# Patient Record
Sex: FEMALE | Race: WHITE | Employment: OTHER | URBAN - NONMETROPOLITAN AREA
[De-identification: names, ages, dates, MRNs, and addresses within clinical notes are randomized per-mention and may not be internally consistent; named-entity substitution may affect disease eponyms.]

---

## 2021-03-25 ENCOUNTER — OFFICE VISIT (OUTPATIENT)
Dept: CARDIOLOGY CLINIC | Age: 76
End: 2021-03-25
Payer: MEDICARE

## 2021-03-25 VITALS
HEIGHT: 61 IN | OXYGEN SATURATION: 98 % | DIASTOLIC BLOOD PRESSURE: 72 MMHG | SYSTOLIC BLOOD PRESSURE: 128 MMHG | HEART RATE: 78 BPM | WEIGHT: 279 LBS | RESPIRATION RATE: 22 BRPM | BODY MASS INDEX: 52.67 KG/M2

## 2021-03-25 DIAGNOSIS — I34.0 MITRAL VALVE INSUFFICIENCY, UNSPECIFIED ETIOLOGY: ICD-10-CM

## 2021-03-25 DIAGNOSIS — I35.0 AORTIC VALVE STENOSIS, ETIOLOGY OF CARDIAC VALVE DISEASE UNSPECIFIED: ICD-10-CM

## 2021-03-25 DIAGNOSIS — I10 HYPERTENSION, UNSPECIFIED TYPE: Primary | ICD-10-CM

## 2021-03-25 DIAGNOSIS — E78.5 HYPERLIPIDEMIA, UNSPECIFIED HYPERLIPIDEMIA TYPE: ICD-10-CM

## 2021-03-25 PROCEDURE — 99203 OFFICE O/P NEW LOW 30 MIN: CPT | Performed by: INTERNAL MEDICINE

## 2021-03-25 PROCEDURE — 93000 ELECTROCARDIOGRAM COMPLETE: CPT | Performed by: INTERNAL MEDICINE

## 2021-03-25 RX ORDER — ARIPIPRAZOLE 5 MG/1
5 TABLET ORAL DAILY
COMMUNITY

## 2021-03-25 RX ORDER — ISOSORBIDE MONONITRATE 30 MG/1
30 TABLET, EXTENDED RELEASE ORAL DAILY
COMMUNITY
End: 2022-10-28 | Stop reason: ALTCHOICE

## 2021-03-25 RX ORDER — ACETAMINOPHEN 500 MG
500 TABLET ORAL EVERY 6 HOURS PRN
COMMUNITY

## 2021-03-25 RX ORDER — SPIRONOLACTONE 25 MG/1
12.5 TABLET ORAL DAILY
Qty: 90 TABLET | Refills: 1 | Status: SHIPPED | OUTPATIENT
Start: 2021-03-25 | End: 2022-06-13

## 2021-03-25 RX ORDER — POTASSIUM CHLORIDE 1.5 G/1.77G
POWDER, FOR SOLUTION ORAL 2 TIMES DAILY
COMMUNITY
End: 2021-03-25 | Stop reason: ALTCHOICE

## 2021-03-25 RX ORDER — HYDROXYCHLOROQUINE SULFATE 200 MG/1
200 TABLET, FILM COATED ORAL 2 TIMES DAILY
COMMUNITY
Start: 2020-09-29

## 2021-03-25 RX ORDER — ALLOPURINOL 300 MG/1
300 TABLET ORAL DAILY
COMMUNITY

## 2021-03-25 RX ORDER — FUROSEMIDE 40 MG/1
80 TABLET ORAL 2 TIMES DAILY
COMMUNITY

## 2021-03-25 RX ORDER — LAMOTRIGINE 25 MG/1
25 TABLET, CHEWABLE ORAL DAILY
COMMUNITY

## 2021-03-25 RX ORDER — ALPRAZOLAM 0.5 MG/1
0.5 TABLET ORAL 2 TIMES DAILY PRN
COMMUNITY

## 2021-03-25 RX ORDER — HYDROXYZINE HYDROCHLORIDE 10 MG/1
10 TABLET, FILM COATED ORAL 3 TIMES DAILY PRN
COMMUNITY

## 2021-03-25 RX ORDER — FOLIC ACID 1 MG/1
1 TABLET ORAL DAILY
COMMUNITY
Start: 2021-01-26

## 2021-03-25 RX ORDER — METOPROLOL TARTRATE 50 MG/1
50 TABLET, FILM COATED ORAL 2 TIMES DAILY
COMMUNITY

## 2021-03-25 RX ORDER — HYDROCHLOROTHIAZIDE 12.5 MG/1
12.5 CAPSULE, GELATIN COATED ORAL DAILY
COMMUNITY
End: 2021-03-25

## 2021-03-25 RX ORDER — COLCHICINE 0.6 MG/1
0.6 TABLET ORAL DAILY
COMMUNITY

## 2021-03-25 RX ORDER — AMITRIPTYLINE HYDROCHLORIDE 50 MG/1
50 TABLET, FILM COATED ORAL NIGHTLY
COMMUNITY

## 2021-03-25 RX ORDER — PREDNISONE 1 MG/1
TABLET ORAL
COMMUNITY
Start: 2021-01-26

## 2021-03-25 NOTE — PATIENT INSTRUCTIONS
Stop potassium chloride (KLOR-CON) 20 MEQ packet    Start spirolactone  12.5mg daily     Have your blood work done in 2 weeks

## 2021-03-25 NOTE — PROGRESS NOTES
[facility]   Cardiac Consultation    Referring Provider:  Kaushal Evans MD     Chief Complaint   Patient presents with    New Patient    Hypertension        History of Present Illness:  Jose Levi is a 76 y.o. female with a history of hypertension and hyperlipidemia, here as a new consult for right ventricular hypertrophy. She reports she is short of breath with activity, this is not a new issue. Not worsening. She has chronic bilateral lower extremity edema. Discussed her mild valve condition- will repeat echo in 6 months. Scheduled for PFT's. Has a hx of sleep apnea. Denies exertional chest pain, palpitations, light-headedness. Past Medical History:   has a past medical history of Hyperlipidemia and Hypertension. Surgical History:   has no past surgical history on file. Social History:   reports that she quit smoking about 51 years ago. She does not have any smokeless tobacco history on file. She reports previous alcohol use. She reports that she does not use drugs. Family History:  family history is not on file. Home Medications:  Prior to Admission medications    Medication Sig Start Date End Date Taking?  Authorizing Provider   hydroxychloroquine (PLAQUENIL) 200 MG tablet Take 200 mg by mouth 2 times daily 9/29/20  Yes Historical Provider, MD   metFORMIN (GLUCOPHAGE) 500 MG tablet Take 500 mg by mouth 2 times daily (with meals)   Yes Historical Provider, MD   metoprolol tartrate (LOPRESSOR) 50 MG tablet Take 50 mg by mouth 2 times daily   Yes Historical Provider, MD   predniSONE (DELTASONE) 5 MG tablet Take 1 tab daily 1/26/21  Yes Historical Provider, MD   acetaminophen (TYLENOL) 500 MG tablet Take 500 mg by mouth every 6 hours as needed   Yes Historical Provider, MD   folic acid (FOLVITE) 1 MG tablet Take 1 mg by mouth daily 1/26/21  Yes Historical Provider, MD   furosemide (LASIX) 40 MG tablet Take 80 mg by mouth 2 times daily   Yes Historical Provider, MD ALPRAZolam (XANAX) 0.5 MG tablet Take 0.5 mg by mouth 2 times daily as needed. Yes Historical Provider, MD   Multiple Vitamins-Minerals (OCUVITE ADULT 50+ PO) Take by mouth   Yes Historical Provider, MD   allopurinol (ZYLOPRIM) 300 MG tablet Take 300 mg by mouth daily   Yes Historical Provider, MD   ARIPiprazole (ABILIFY) 5 MG tablet Take 5 mg by mouth daily   Yes Historical Provider, MD   lamoTRIgine (LAMICTAL) 25 MG CHEW chew tab Take 25 mg by mouth daily   Yes Historical Provider, MD   colchicine (COLCRYS) 0.6 MG tablet Take 0.6 mg by mouth daily   Yes Historical Provider, MD   isosorbide mononitrate (IMDUR) 30 MG extended release tablet Take 30 mg by mouth daily   Yes Historical Provider, MD   amitriptyline (ELAVIL) 50 MG tablet Take 50 mg by mouth nightly   Yes Historical Provider, MD   hydrOXYzine (ATARAX) 10 MG tablet Take 10 mg by mouth 3 times daily as needed for Itching   Yes Historical Provider, MD   spironolactone (ALDACTONE) 25 MG tablet Take 0.5 tablets by mouth daily 3/25/21  Yes Palomo Casanova MD        Allergies:  No known allergies     Review of Systems:   · Constitutional: there has been no unanticipated weight loss. There's been no change in energy level, sleep pattern, or activity level. · Eyes: No visual changes or diplopia. No scleral icterus. · ENT: No Headaches, hearing loss or vertigo. No mouth sores or sore throat. · Cardiovascular: Reviewed in HPI  · Respiratory: No cough or wheezing, no sputum production. No hematemesis. · Gastrointestinal: No abdominal pain, appetite loss, blood in stools. No change in bowel or bladder habits. · Genitourinary: No dysuria, trouble voiding, or hematuria. · Musculoskeletal:  No gait disturbance, weakness or joint complaints. · Integumentary: No rash or pruritis. · Neurological: No headache, diplopia, change in muscle strength, numbness or tingling.  No change in gait, balance, coordination, mood, affect, memory, mentation, behavior. · Psychiatric: No anxiety, no depression. · Endocrine: No malaise, fatigue or temperature intolerance. No excessive thirst, fluid intake, or urination. No tremor. · Hematologic/Lymphatic: No abnormal bruising or bleeding, blood clots or swollen lymph nodes. · Allergic/Immunologic: No nasal congestion or hives. Physical Examination:    Vitals:    03/25/21 0923   BP: 128/72   Pulse: 78   Resp: 22   SpO2: 98%        Wt Readings from Last 1 Encounters:   03/25/21 279 lb (126.6 kg)       Constitutional and General Appearance:NAD  Skin:good turgor,intact without lesions  HEENT: EOMI ,normal  Neck:no JVD     Respiratory:  · Normal excursion and expansion without use of accessory muscles  · Resp Auscultation: Normal breath sounds without dullness  Cardiovascular:  · Soft murmur   · Heart tones are crisp and normal  · Cervical veins are not engorged  · The carotid upstroke is normal in amplitude and contour without delay or bruit  · Peripheral pulses are symmetrical and full  · There is no clubbing, cyanosis of the extremities. · BLE +1   · Femoral Arteries: 2+ and equal  · Pedal Pulses: 2+ and equal   Abdomen:  · No masses or tenderness  · Liver/Spleen: No Abnormalities Noted  Neurological/Psychiatric:  · Alert and oriented in all spheres  · Moves all extremities well +Cane for balance assist   · Exhibits normal gait balance and coordination  · No abnormalities of mood, affect, memory, mentation, or behavior are noted    Echo 7/20/20   Overall left ventricular ejection fraction is estimated to be 55-60%. The left ventricular wall motion is normal.  The Aortic Valve is mildly calcified. There is mild concentric left ventricular hypertrophy. There is mild mitral regurgitation. Right ventricular systolic pressure is severely elevated at >55 mmHg. The mitral inflow pattern is consistent with Diastolic Dysfunction. Mild valvular aortic stenosis. Assessment/Plan:    1.  Hypertension- Blood pressure 128/72, pulse 78, resp. rate 22, height 5' 1\" (1.549 m), weight 279 lb (126.6 kg), SpO2 98 %. 2. Hyperlipidemia- controlled- managed by PCP    3. Elevated right ventricular systolic pressure- severely elevated at >55 mmHg. 4. Aortic valve stenosis / MR- mild - repeat echo in 6 months          Plan:  Guero Arenas has a stable cardiac status. Cardiac test and lab results personally reviewed by me during this office visit and discussed. Stop potassium chloride (KLOR-CON) 20 MEQ packet  Start spironolactone  12.5mg daily   Have your blood work done in 2 weeks- ordered by PCP    Continue risk factor modifications. Call for any change in symptoms, call to report any changes in shortness of breath or development of chest pain with activity. Return for regular follow up in 6 months with echo . I appreciate the opportunity of cooperating in the care of this individual.    Zaki Choudhury. Mickey Herrera M.D., [facility]    Patient's problem list, medications, allergies, past medical, surgical, social and family histories were reviewed and updated as appropriate. Scribe's attestation: This note was scribed in the presence of Dr. Mickey Herrera MD, by Aidan Du RN. The scribe's documentation has been prepared under my direction and personally reviewed by me in its entirety. I confirm that the note above accurately reflects all work, treatment, procedures, and medical decision making performed by me.

## 2021-09-10 ENCOUNTER — PROCEDURE VISIT (OUTPATIENT)
Dept: CARDIOLOGY CLINIC | Age: 76
End: 2021-09-10
Payer: MEDICARE

## 2021-09-10 ENCOUNTER — OFFICE VISIT (OUTPATIENT)
Dept: CARDIOLOGY CLINIC | Age: 76
End: 2021-09-10
Payer: MEDICARE

## 2021-09-10 VITALS
WEIGHT: 281 LBS | BODY MASS INDEX: 53.05 KG/M2 | OXYGEN SATURATION: 96 % | HEART RATE: 92 BPM | DIASTOLIC BLOOD PRESSURE: 82 MMHG | SYSTOLIC BLOOD PRESSURE: 144 MMHG | HEIGHT: 61 IN

## 2021-09-10 DIAGNOSIS — I10 HYPERTENSION, UNSPECIFIED TYPE: Primary | ICD-10-CM

## 2021-09-10 DIAGNOSIS — I34.0 MITRAL VALVE INSUFFICIENCY, UNSPECIFIED ETIOLOGY: ICD-10-CM

## 2021-09-10 DIAGNOSIS — E78.5 HYPERLIPIDEMIA, UNSPECIFIED HYPERLIPIDEMIA TYPE: ICD-10-CM

## 2021-09-10 DIAGNOSIS — I35.0 AORTIC VALVE STENOSIS, ETIOLOGY OF CARDIAC VALVE DISEASE UNSPECIFIED: ICD-10-CM

## 2021-09-10 LAB
LV EF: 60 %
LVEF MODALITY: NORMAL

## 2021-09-10 PROCEDURE — 93306 TTE W/DOPPLER COMPLETE: CPT | Performed by: INTERNAL MEDICINE

## 2021-09-10 PROCEDURE — 99214 OFFICE O/P EST MOD 30 MIN: CPT | Performed by: INTERNAL MEDICINE

## 2021-09-10 RX ORDER — HYDROCHLOROTHIAZIDE 12.5 MG/1
12.5 CAPSULE, GELATIN COATED ORAL DAILY
COMMUNITY

## 2021-09-10 RX ORDER — DIPHENOXYLATE HYDROCHLORIDE AND ATROPINE SULFATE 2.5; .025 MG/1; MG/1
1 TABLET ORAL 4 TIMES DAILY PRN
COMMUNITY

## 2021-09-10 NOTE — PROGRESS NOTES
[address]   Cardiac Follow Up     Referring Provider:  Maryjo Arcos MD     Chief Complaint   Patient presents with    6 Month Follow-Up    Hypertension        History of Present Illness:  Romel Berumen is a 68 y.o. female with a history of hypertension and hyperlipidemia,  right ventricular hypertrophy. She reports she is short of breath with activity, this is not a new issue. She has chronic bilateral lower extremity edema. She is currently on an Antibiotic for PNA, reports sob feels slightly better since starting. Discussed her mild valve condition- echo done today. Has a hx of sleep apnea. Denies exertional chest pain, palpitations, light-headedness. She had a stress test with her PCP done yesterday- discussed results. Past Medical History:   has a past medical history of Hyperlipidemia and Hypertension. Surgical History:   has no past surgical history on file. Social History:   reports that she quit smoking about 51 years ago. She has never used smokeless tobacco. She reports previous alcohol use. She reports that she does not use drugs. Family History:  family history is not on file. Home Medications:  Prior to Admission medications    Medication Sig Start Date End Date Taking? Authorizing Provider   diphenoxylate-atropine (LOMOTIL) 2.5-0.025 MG per tablet Take 1 tablet by mouth 4 times daily as needed.    Yes Historical Provider, MD   hydroCHLOROthiazide (MICROZIDE) 12.5 MG capsule Take 12.5 mg by mouth daily PRN   Yes Historical Provider, MD   hydroxychloroquine (PLAQUENIL) 200 MG tablet Take 200 mg by mouth 2 times daily 9/29/20  Yes Historical Provider, MD   metFORMIN (GLUCOPHAGE) 500 MG tablet Take 500 mg by mouth 2 times daily (with meals)   Yes Historical Provider, MD   metoprolol tartrate (LOPRESSOR) 50 MG tablet Take 50 mg by mouth 2 times daily   Yes Historical Provider, MD   predniSONE (DELTASONE) 5 MG tablet Take 1 tab daily 1/26/21  Yes Historical Provider, MD   acetaminophen (TYLENOL) 500 MG tablet Take 500 mg by mouth every 6 hours as needed   Yes Historical Provider, MD   folic acid (FOLVITE) 1 MG tablet Take 1 mg by mouth daily 1/26/21  Yes Historical Provider, MD   furosemide (LASIX) 40 MG tablet Take 80 mg by mouth 2 times daily   Yes Historical Provider, MD   Multiple Vitamins-Minerals (OCUVITE ADULT 50+ PO) Take by mouth   Yes Historical Provider, MD   allopurinol (ZYLOPRIM) 300 MG tablet Take 300 mg by mouth daily   Yes Historical Provider, MD   ARIPiprazole (ABILIFY) 5 MG tablet Take 5 mg by mouth daily   Yes Historical Provider, MD   lamoTRIgine (LAMICTAL) 25 MG CHEW chew tab Take 25 mg by mouth daily   Yes Historical Provider, MD   colchicine (COLCRYS) 0.6 MG tablet Take 0.6 mg by mouth daily   Yes Historical Provider, MD   isosorbide mononitrate (IMDUR) 30 MG extended release tablet Take 30 mg by mouth daily   Yes Historical Provider, MD   amitriptyline (ELAVIL) 50 MG tablet Take 50 mg by mouth nightly   Yes Historical Provider, MD   hydrOXYzine (ATARAX) 10 MG tablet Take 10 mg by mouth 3 times daily as needed for Itching   Yes Historical Provider, MD   spironolactone (ALDACTONE) 25 MG tablet Take 0.5 tablets by mouth daily 3/25/21  Yes Ana Moses MD   ALPRAZolam Jazmin Caballero) 0.5 MG tablet Take 0.5 mg by mouth 2 times daily as needed. Patient not taking: Reported on 9/10/2021    Historical Provider, MD        Allergies:  No known allergies     Review of Systems:   · Constitutional: there has been no unanticipated weight loss. There's been no change in energy level, sleep pattern, or activity level. · Eyes: No visual changes or diplopia. No scleral icterus. · ENT: No Headaches, hearing loss or vertigo. No mouth sores or sore throat. · Cardiovascular: Reviewed in HPI  · Respiratory: No cough or wheezing, no sputum production. No hematemesis. · Gastrointestinal: No abdominal pain, appetite loss, blood in stools.  No change in bowel or bladder habits. · Genitourinary: No dysuria, trouble voiding, or hematuria. · Musculoskeletal:  No gait disturbance, weakness or joint complaints. · Integumentary: No rash or pruritis. · Neurological: No headache, diplopia, change in muscle strength, numbness or tingling. No change in gait, balance, coordination, mood, affect, memory, mentation, behavior. · Psychiatric: No anxiety, no depression. · Endocrine: No malaise, fatigue or temperature intolerance. No excessive thirst, fluid intake, or urination. No tremor. · Hematologic/Lymphatic: No abnormal bruising or bleeding, blood clots or swollen lymph nodes. · Allergic/Immunologic: No nasal congestion or hives. Physical Examination:    Vitals:    09/10/21 1438   BP: (!) 144/82   Pulse: 92   SpO2: 96%        Wt Readings from Last 1 Encounters:   09/10/21 281 lb (127.5 kg)       Constitutional and General Appearance:NAD  Skin:good turgor,intact without lesions  HEENT: EOMI ,normal  Neck:no JVD     Respiratory:  · Normal excursion and expansion without use of accessory muscles  · Resp Auscultation: Normal breath sounds without dullness  Cardiovascular:  · Soft murmur   · Heart tones are crisp and normal  · Cervical veins are not engorged  · The carotid upstroke is normal in amplitude and contour without delay or bruit  · Peripheral pulses are symmetrical and full  · There is no clubbing, cyanosis of the extremities. · BLE +1   · Femoral Arteries: 2+ and equal  · Pedal Pulses: 2+ and equal   Abdomen:  · No masses or tenderness  · Liver/Spleen: No Abnormalities Noted  Neurological/Psychiatric:  · Alert and oriented in all spheres  · Moves all extremities well +Cane for balance assist   · Exhibits normal gait balance and coordination  · No abnormalities of mood, affect, memory, mentation, or behavior are noted    Echo 7/20/20   Overall left ventricular ejection fraction is estimated to be 55-60%.   The left ventricular wall motion is normal.  The Aortic Valve is mildly calcified. There is mild concentric left ventricular hypertrophy. There is mild mitral regurgitation. Right ventricular systolic pressure is severely elevated at >55 mmHg. The mitral inflow pattern is consistent with Diastolic Dysfunction. Mild valvular aortic stenosis. Assessment/Plan:    1. Hypertension- Blood pressure (!) 144/82, pulse 92, height 5' 1\" (1.549 m), weight 281 lb (127.5 kg), SpO2 96 %. 2. Hyperlipidemia- controlled- managed by PCP    3. Elevated right ventricular systolic pressure- severely elevated at >55 mmHg. - Echo today 9/10/21>PASP 39mmHg. 4. Aortic valve stenosis / MR- mild - stable   5. Abnormal stress test-  Lexiscan 9/9/21> Mild stress induced reversible ischemia of the inferior lateral wall. Wall motion abnormality of the apex, inferior wall and septum with abnormally low ejection fraction of 50%. Discussed results of stress testing- would consider angiogram high risk . May be falsely abnormal so cannot justify risk of testing    Plan:  Cardiac test and lab results personally reviewed by me during this office visit and discussed. Discussed results of stress testing- would consider angiogram high risk   Continue risk factor modifications. Call for any change in symptoms, call to report any changes in shortness of breath or development of chest pain with activity. Return for regular follow up in 3 months . I appreciate the opportunity of cooperating in the care of this individual.    Kishore Zavala M.D., [facility] [place]    Patient's problem list, medications, allergies, past medical, surgical, social and family histories were reviewed and updated as appropriate. Scribe's attestation: This note was scribed in the presence of Dr. Ashutosh Zavala MD, by Cheryl Arciniega RN. The scribe's documentation has been prepared under my direction and personally reviewed by me in its entirety.  I confirm that the note above accurately reflects all work, treatment, procedures, and medical decision making performed by me.

## 2021-11-02 NOTE — PROGRESS NOTES
[address]   Cardiac Follow Up     Referring Provider:  Fuad Weaver MD     Chief Complaint   Patient presents with    Hypertension    Hyperlipidemia    Cardiac Valve Problem        History of Present Illness:  Kelvin Contreras is a 68 y.o. female with a history of hypertension and hyperlipidemia,  right ventricular hypertrophy. She reports she is short of breath with activity, this is not a new issue/unchanged. She has chronic bilateral lower extremity edema. . Has a hx of sleep apnea. Denies exertional chest pain, palpitations, light-headedness. She is here with her daughter. Past Medical History:   has a past medical history of Hyperlipidemia and Hypertension. Surgical History:   has no past surgical history on file. Social History:   reports that she quit smoking about 51 years ago. She has never used smokeless tobacco. She reports previous alcohol use. She reports that she does not use drugs. Family History:  family history is not on file. Home Medications:  Prior to Admission medications    Medication Sig Start Date End Date Taking? Authorizing Provider   hydroCHLOROthiazide (MICROZIDE) 12.5 MG capsule Take 12.5 mg by mouth daily PRN   Yes Historical Provider, MD   hydroxychloroquine (PLAQUENIL) 200 MG tablet Take 200 mg by mouth 2 times daily 9/29/20  Yes Historical Provider, MD   metFORMIN (GLUCOPHAGE) 500 MG tablet Take 500 mg by mouth 2 times daily (with meals)   Yes Historical Provider, MD   metoprolol tartrate (LOPRESSOR) 50 MG tablet Take 50 mg by mouth 2 times daily   Yes Historical Provider, MD   predniSONE (DELTASONE) 5 MG tablet Take 1 tab daily 1/26/21  Yes Historical Provider, MD   folic acid (FOLVITE) 1 MG tablet Take 1 mg by mouth daily 1/26/21  Yes Historical Provider, MD   furosemide (LASIX) 40 MG tablet Take 80 mg by mouth 2 times daily   Yes Historical Provider, MD   ALPRAZolam (XANAX) 0.5 MG tablet Take 0.5 mg by mouth 2 times daily as needed. Yes Historical Provider, MD   allopurinol (ZYLOPRIM) 300 MG tablet Take 300 mg by mouth daily   Yes Historical Provider, MD   ARIPiprazole (ABILIFY) 5 MG tablet Take 5 mg by mouth daily   Yes Historical Provider, MD   lamoTRIgine (LAMICTAL) 25 MG CHEW chew tab Take 25 mg by mouth daily   Yes Historical Provider, MD   isosorbide mononitrate (IMDUR) 30 MG extended release tablet Take 30 mg by mouth daily   Yes Historical Provider, MD   amitriptyline (ELAVIL) 50 MG tablet Take 50 mg by mouth nightly   Yes Historical Provider, MD   hydrOXYzine (ATARAX) 10 MG tablet Take 10 mg by mouth 3 times daily as needed for Itching   Yes Historical Provider, MD   spironolactone (ALDACTONE) 25 MG tablet Take 0.5 tablets by mouth daily 3/25/21  Yes Kodak Lawrence MD   methotrexate (RHEUMATREX) 2.5 MG chemo tablet  10/21/21   Historical Provider, MD   diphenoxylate-atropine (LOMOTIL) 2.5-0.025 MG per tablet Take 1 tablet by mouth 4 times daily as needed. Historical Provider, MD   acetaminophen (TYLENOL) 500 MG tablet Take 500 mg by mouth every 6 hours as needed    Historical Provider, MD   Multiple Vitamins-Minerals (OCUVITE ADULT 50+ PO) Take by mouth    Historical Provider, MD   colchicine (COLCRYS) 0.6 MG tablet Take 0.6 mg by mouth daily    Historical Provider, MD        Allergies:  No known allergies     Review of Systems:   · Constitutional: there has been no unanticipated weight loss. There's been no change in energy level, sleep pattern, or activity level. · Eyes: No visual changes or diplopia. No scleral icterus. · ENT: No Headaches, hearing loss or vertigo. No mouth sores or sore throat. · Cardiovascular: Reviewed in HPI  · Respiratory: No cough or wheezing, no sputum production. No hematemesis. · Gastrointestinal: No abdominal pain, appetite loss, blood in stools. No change in bowel or bladder habits. · Genitourinary: No dysuria, trouble voiding, or hematuria.   · Musculoskeletal:  No gait disturbance, weakness or joint complaints. · Integumentary: No rash or pruritis. · Neurological: No headache, diplopia, change in muscle strength, numbness or tingling. No change in gait, balance, coordination, mood, affect, memory, mentation, behavior. · Psychiatric: No anxiety, no depression. · Endocrine: No malaise, fatigue or temperature intolerance. No excessive thirst, fluid intake, or urination. No tremor. · Hematologic/Lymphatic: No abnormal bruising or bleeding, blood clots or swollen lymph nodes. · Allergic/Immunologic: No nasal congestion or hives. Physical Examination:    Vitals:    11/23/21 0811   BP: 124/70   Pulse: 92   SpO2: 98%        Wt Readings from Last 1 Encounters:   11/23/21 289 lb (131.1 kg)       Constitutional and General Appearance:NAD  Skin:good turgor,intact without lesions  HEENT: EOMI ,normal  Neck:no JVD     Respiratory:  · Normal excursion and expansion without use of accessory muscles  · Resp Auscultation: Normal breath sounds without dullness  Cardiovascular:  · Soft murmur   · Heart tones are crisp and normal  · Cervical veins are not engorged  · The carotid upstroke is normal in amplitude and contour without delay or bruit  · Peripheral pulses are symmetrical and full  · There is no clubbing, cyanosis of the extremities. · BLE +1   · Femoral Arteries: 2+ and equal  · Pedal Pulses: 2+ and equal   Abdomen:  · No masses or tenderness  · Liver/Spleen: No Abnormalities Noted  Neurological/Psychiatric:  · Alert and oriented in all spheres  · Moves all extremities well +Cane for balance assist   · Exhibits normal gait balance and coordination  · No abnormalities of mood, affect, memory, mentation, or behavior are noted    Echo 7/20/20   Overall left ventricular ejection fraction is estimated to be 55-60%. The left ventricular wall motion is normal.  The Aortic Valve is mildly calcified. There is mild concentric left ventricular hypertrophy. There is mild mitral regurgitation.   Right ventricular systolic pressure is severely elevated at >55 mmHg. The mitral inflow pattern is consistent with Diastolic Dysfunction. Mild valvular aortic stenosis. Assessment/Plan:    1. Hypertension- Blood pressure 124/70, pulse 92, height 5' 1\" (1.549 m), weight 289 lb (131.1 kg), SpO2 98 %. 2. Hyperlipidemia- controlled- managed by PCP    3. Elevated right ventricular systolic pressure- severely elevated at >55 mmHg. - Echo today 9/10/21>PASP 39mmHg. 4. Aortic valve stenosis / MR- mild - stable   5. Abnormal stress test-  Lexiscan 9/9/21> Mild stress induced reversible ischemia of the inferior lateral wall. Wall motion abnormality of the apex, inferior wall and septum with abnormally low ejection fraction of 50%. Discussed results of stress testing- would consider angiogram high risk . May be falsely abnormal so cannot justify risk of testing    Plan:  Cardiac test and lab results personally reviewed by me during this office visit and discussed. Previously discussed results of stress testing- would consider angiogram high risk   Continue risk factor modifications. Call for any change in symptoms, call to report any changes in shortness of breath or development of chest pain with activity. Return for regular follow up in 6 months, plan for echo after next visit . I appreciate the opportunity of cooperating in the care of this individual.    Ernesto Faith. Jose Sands M.D., [facility] [place]    Patient's problem list, medications, allergies, past medical, surgical, social and family histories were reviewed and updated as appropriate. Scribe's attestation: This note was scribed in the presence of Dr. Jose Sands MD, by Wero Schofield RN. The scribe's documentation has been prepared under my direction and personally reviewed by me in its entirety. I confirm that the note above accurately reflects all work, treatment, procedures, and medical decision making performed by me.

## 2021-11-23 ENCOUNTER — OFFICE VISIT (OUTPATIENT)
Dept: CARDIOLOGY CLINIC | Age: 76
End: 2021-11-23
Payer: MEDICARE

## 2021-11-23 VITALS
OXYGEN SATURATION: 98 % | HEART RATE: 92 BPM | HEIGHT: 61 IN | BODY MASS INDEX: 54.56 KG/M2 | SYSTOLIC BLOOD PRESSURE: 124 MMHG | WEIGHT: 289 LBS | DIASTOLIC BLOOD PRESSURE: 70 MMHG

## 2021-11-23 DIAGNOSIS — I10 HYPERTENSION, UNSPECIFIED TYPE: ICD-10-CM

## 2021-11-23 DIAGNOSIS — E78.5 HYPERLIPIDEMIA, UNSPECIFIED HYPERLIPIDEMIA TYPE: ICD-10-CM

## 2021-11-23 DIAGNOSIS — I34.0 MITRAL VALVE INSUFFICIENCY, UNSPECIFIED ETIOLOGY: Primary | ICD-10-CM

## 2021-11-23 DIAGNOSIS — I35.0 AORTIC VALVE STENOSIS, ETIOLOGY OF CARDIAC VALVE DISEASE UNSPECIFIED: ICD-10-CM

## 2021-11-23 PROCEDURE — 99214 OFFICE O/P EST MOD 30 MIN: CPT | Performed by: INTERNAL MEDICINE

## 2021-11-23 RX ORDER — METHOTREXATE 2.5 MG/1
TABLET ORAL
COMMUNITY
Start: 2021-10-21

## 2022-05-04 NOTE — PROGRESS NOTES
[facility]   Cardiac Follow Up     Referring Provider:  Sonali Haddad MD     Chief Complaint   Patient presents with    Hypertension    Shortness of Breath    6 Month Follow-Up        History of Present Illness:  Jourdan Adams is a 68 y.o. female with a history of hypertension and hyperlipidemia,  right ventricular hypertrophy. She has chronic bilateral lower extremity edema. . Has a hx of sleep apnea. Today she is here for 6 mos follow up. She is using a cane. States when she walks too far , for example here to parking lot, she gets SOB which is not worsening for her. She cleans the house, does laundry for her son but is struggling to pull the weeds. She does not follow a certain diet, but has lost some weight. Pt denies exertional chest pain, PND, palpitations, light-headedness. Past Medical History:   has a past medical history of Hyperlipidemia and Hypertension. Surgical History:   has no past surgical history on file. Social History:   reports that she quit smoking about 52 years ago. She has never used smokeless tobacco. She reports previous alcohol use. She reports that she does not use drugs. Family History:  family history is not on file. Home Medications:  Prior to Admission medications    Medication Sig Start Date End Date Taking? Authorizing Provider   methotrexate (RHEUMATREX) 2.5 MG chemo tablet  10/21/21  Yes Historical Provider, MD   diphenoxylate-atropine (LOMOTIL) 2.5-0.025 MG per tablet Take 1 tablet by mouth 4 times daily as needed.    Yes Historical Provider, MD   hydroCHLOROthiazide (MICROZIDE) 12.5 MG capsule Take 12.5 mg by mouth daily PRN   Yes Historical Provider, MD   hydroxychloroquine (PLAQUENIL) 200 MG tablet Take 200 mg by mouth 2 times daily 9/29/20  Yes Historical Provider, MD   metFORMIN (GLUCOPHAGE) 500 MG tablet Take 500 mg by mouth 2 times daily (with meals)   Yes Historical Provider, MD   metoprolol tartrate (LOPRESSOR) 50 MG tablet Take 50 mg by mouth 2 times daily   Yes Historical Provider, MD   predniSONE (DELTASONE) 5 MG tablet Take 1 tab daily 1/26/21  Yes Historical Provider, MD   acetaminophen (TYLENOL) 500 MG tablet Take 500 mg by mouth every 6 hours as needed   Yes Historical Provider, MD   folic acid (FOLVITE) 1 MG tablet Take 1 mg by mouth daily 1/26/21  Yes Historical Provider, MD   furosemide (LASIX) 40 MG tablet Take 80 mg by mouth 2 times daily   Yes Historical Provider, MD   ALPRAZolam (XANAX) 0.5 MG tablet Take 0.5 mg by mouth 2 times daily as needed. Yes Historical Provider, MD   Multiple Vitamins-Minerals (OCUVITE ADULT 50+ PO) Take by mouth   Yes Historical Provider, MD   allopurinol (ZYLOPRIM) 300 MG tablet Take 300 mg by mouth daily   Yes Historical Provider, MD   ARIPiprazole (ABILIFY) 5 MG tablet Take 5 mg by mouth daily   Yes Historical Provider, MD   lamoTRIgine (LAMICTAL) 25 MG CHEW chew tab Take 25 mg by mouth daily   Yes Historical Provider, MD   colchicine (COLCRYS) 0.6 MG tablet Take 0.6 mg by mouth daily   Yes Historical Provider, MD   isosorbide mononitrate (IMDUR) 30 MG extended release tablet Take 30 mg by mouth daily   Yes Historical Provider, MD   amitriptyline (ELAVIL) 50 MG tablet Take 50 mg by mouth nightly   Yes Historical Provider, MD   hydrOXYzine (ATARAX) 10 MG tablet Take 10 mg by mouth 3 times daily as needed for Itching   Yes Historical Provider, MD   spironolactone (ALDACTONE) 25 MG tablet Take 0.5 tablets by mouth daily 3/25/21  Yes Marisela Mcclelland MD        Allergies:  No known allergies     Review of Systems:   · Constitutional: there has been no unanticipated weight loss. There's been no change in energy level, sleep pattern, or activity level. · Eyes: No visual changes or diplopia. No scleral icterus. · ENT: No Headaches, hearing loss or vertigo. No mouth sores or sore throat. · Cardiovascular: Reviewed in HPI  · Respiratory: No cough or wheezing, no sputum production. No hematemesis. · Gastrointestinal: No abdominal pain, appetite loss, blood in stools. No change in bowel or bladder habits. · Genitourinary: No dysuria, trouble voiding, or hematuria. · Musculoskeletal:  No gait disturbance, weakness or joint complaints. · Integumentary: No rash or pruritis. · Neurological: No headache, diplopia, change in muscle strength, numbness or tingling. No change in gait, balance, coordination, mood, affect, memory, mentation, behavior. · Psychiatric: No anxiety, no depression. · Endocrine: No malaise, fatigue or temperature intolerance. No excessive thirst, fluid intake, or urination. No tremor. · Hematologic/Lymphatic: No abnormal bruising or bleeding, blood clots or swollen lymph nodes. · Allergic/Immunologic: No nasal congestion or hives. Physical Examination:    Vitals:    05/12/22 1340   BP: 122/68   Pulse: 70   SpO2: 98%        Wt Readings from Last 1 Encounters:   05/12/22 278 lb 14.4 oz (126.5 kg)       Constitutional and General Appearance:NAD  Skin:good turgor,intact without lesions  HEENT: EOMI ,normal  Neck:no JVD     Respiratory:  · Normal excursion and expansion without use of accessory muscles  · Resp Auscultation: Normal breath sounds without dullness  Cardiovascular:  · Soft murmur + , stable  · Heart tones are crisp and normal  · Cervical veins are not engorged  · The carotid upstroke is normal in amplitude and contour without delay or bruit  · Peripheral pulses are symmetrical and full  · There is no clubbing, cyanosis of the extremities.   · BLE +1   · Femoral Arteries: 2+ and equal  · Pedal Pulses: 2+ and equal   Abdomen:  · No masses or tenderness  · Liver/Spleen: No Abnormalities Noted  Neurological/Psychiatric:  · Alert and oriented in all spheres  · Moves all extremities well +Cane for balance assist   · Exhibits normal gait balance and coordination  · No abnormalities of mood, affect, memory, mentation, or behavior are noted    Echo 9/2021  Summary  Technically difficult examination. Normal left ventricle size and systolic function with an estimated ejection fraction of 60%. No regional wall motion abnormalities are  seen. There is concentric left ventricular hypertrophy. E/e\"= 13. Diastolic filling parameters suggests grade I diastolic dysfunction. Mild mitral regurgitation  The left atrium is mildly dilated. The aortic valve area is calculated at 1.44 cm2 with a maximum pressure gradient of 23 mmHg and a mean pressure gradient of 15  mmHg. This is c/w mild aortic stenosis. Mild aortic regurgitation. The right ventricle is mildly enlarged but appears normal in function. Moderate tricuspid regurgitation. PASP 39mmHg. The right atrium is dilated. IVC not well visualized. Echo 7/20/20   Overall left ventricular ejection fraction is estimated to be 55-60%. The left ventricular wall motion is normal.  The Aortic Valve is mildly calcified. There is mild concentric left ventricular hypertrophy. There is mild mitral regurgitation. Right ventricular systolic pressure is severely elevated at >55 mmHg. The mitral inflow pattern is consistent with Diastolic Dysfunction. Mild valvular aortic stenosis. Assessment/Plan:    1. Hypertension- Blood pressure 122/68, pulse 70, height 5' 1\" (1.549 m), weight 278 lb 14.4 oz (126.5 kg), SpO2 98 %. 2. Hyperlipidemia controlled-- managed by PCP   8/27/21  TG 90 HDL 54 LDL 77     3. Elevated right ventricular systolic pressure- 6/8583 echo results above     4. Aortic valve stenosis / MR- mild - remains stable     5. Abnormal stress test-  Lexiscan 9/9/21> Mild stress induced reversible ischemia of the inferior lateral wall. Wall motion abnormality of the apex, inferior wall and septum with abnormally low ejection fraction of 50%. Discussed results of stress testing- would consider angiogram high risk .  May be falsely abnormal so cannot justify risk of testing    Plan:  Cardiac test and lab results personally reviewed by me during this office visit and discussed. Echo with next OV  Continue risk factor modifications. Call for any change in symptoms, call to report any changes in shortness of breath or development of chest pain with activity. Return for regular follow up in 6 mos with echo same day      I appreciate the opportunity of cooperating in the care of this individual.    Chiquis Sanchez M.D., [facility] [place]    Patient's problem list, medications, allergies, past medical, surgical, social and family histories were reviewed and updated as appropriate. Scribe's attestation: This note was scribed in the presence of Dr Marcella Sanchez by Major Ca RN. The scribe's documentation has been prepared under my direction and personally reviewed by me in its entirety. I confirm that the note above accurately reflects all work, treatment, procedures, and medical decision making performed by me.

## 2022-05-12 ENCOUNTER — OFFICE VISIT (OUTPATIENT)
Dept: CARDIOLOGY CLINIC | Age: 77
End: 2022-05-12
Payer: MEDICARE

## 2022-05-12 VITALS
OXYGEN SATURATION: 98 % | HEIGHT: 61 IN | SYSTOLIC BLOOD PRESSURE: 122 MMHG | HEART RATE: 70 BPM | WEIGHT: 278.9 LBS | DIASTOLIC BLOOD PRESSURE: 68 MMHG | BODY MASS INDEX: 52.66 KG/M2

## 2022-05-12 DIAGNOSIS — I51.7 VENTRICULAR HYPERTROPHY: ICD-10-CM

## 2022-05-12 DIAGNOSIS — E78.5 HYPERLIPIDEMIA, UNSPECIFIED HYPERLIPIDEMIA TYPE: ICD-10-CM

## 2022-05-12 DIAGNOSIS — I35.0 AORTIC VALVE STENOSIS, ETIOLOGY OF CARDIAC VALVE DISEASE UNSPECIFIED: Primary | ICD-10-CM

## 2022-05-12 DIAGNOSIS — I10 HYPERTENSION, UNSPECIFIED TYPE: ICD-10-CM

## 2022-05-12 PROCEDURE — 99214 OFFICE O/P EST MOD 30 MIN: CPT | Performed by: INTERNAL MEDICINE

## 2022-05-12 NOTE — PATIENT INSTRUCTIONS
Continue risk factor modifications. Call for any change in symptoms, call to report any changes in shortness of breath or development of chest pain with activity.   Return for regular follow up in 6 mos with echo same day

## 2022-06-13 RX ORDER — SPIRONOLACTONE 25 MG/1
TABLET ORAL
Qty: 45 TABLET | Refills: 3 | Status: SHIPPED | OUTPATIENT
Start: 2022-06-13

## 2022-06-13 NOTE — TELEPHONE ENCOUNTER
Received refill request for Spironolactone from [address] [place] [address].     Last ov: 05/12/2022 LES    Last labs: 03/24/2022 (care everywhere)    Last Refill: 03/25/2021 #90 w/ 1 refill    Next appointment: 10/28/2022 LES

## 2022-09-29 PROBLEM — I35.0 AORTIC VALVE STENOSIS: Status: ACTIVE | Noted: 2022-09-29

## 2022-09-29 PROBLEM — I10 HYPERTENSION: Status: ACTIVE | Noted: 2022-09-29

## 2022-09-29 PROBLEM — E78.5 HYPERLIPIDEMIA: Status: ACTIVE | Noted: 2022-09-29

## 2022-09-29 PROBLEM — I51.7 VENTRICULAR HYPERTROPHY: Status: ACTIVE | Noted: 2022-09-29

## 2022-09-29 NOTE — PROGRESS NOTES
[facility]   Cardiac Follow Up     Referring Provider:  Jim Vasquez MD     Chief Complaint   Patient presents with    Hyperlipidemia    6 Month Follow-Up     W/ echo          History of Present Illness:  Verenice Lopez is a 68 y.o. female with a history of hypertension and hyperlipidemia,  right ventricular hypertrophy. She has chronic bilateral lower extremity edema. . Has a hx of sleep apnea. Today she is here for 6 mos follow up. Her summer has been good. She had an Echo today prior to visit. . She gets SOB with walking too far. Able to walk into office fine today. She lives with her . No complaints of CP, palpitations or dizziness today. Once in a while her chest will hurt or feel tight, she sits and calms herself down. The last episode was last week. Past Medical History:   has a past medical history of Hyperlipidemia and Hypertension. Surgical History:   has no past surgical history on file. Social History:   reports that she quit smoking about 52 years ago. Her smoking use included cigarettes. She has never used smokeless tobacco. She reports that she does not currently use alcohol. She reports that she does not use drugs. Family History:  family history is not on file. Home Medications:  Prior to Admission medications    Medication Sig Start Date End Date Taking? Authorizing Provider   spironolactone (ALDACTONE) 25 MG tablet TAKE ONE-HALF TABLET BY MOUTH DAILY 6/13/22  Yes Katlyn Sanchez MD   methotrexate (RHEUMATREX) 2.5 MG chemo tablet  10/21/21  Yes Historical Provider, MD   diphenoxylate-atropine (LOMOTIL) 2.5-0.025 MG per tablet Take 1 tablet by mouth 4 times daily as needed.    Yes Historical Provider, MD   hydroCHLOROthiazide (MICROZIDE) 12.5 MG capsule Take 12.5 mg by mouth daily PRN   Yes Historical Provider, MD   hydroxychloroquine (PLAQUENIL) 200 MG tablet Take 200 mg by mouth 2 times daily 9/29/20  Yes Historical Provider, MD   metFORMIN (GLUCOPHAGE) 500 MG tablet Take 500 mg by mouth 2 times daily (with meals)   Yes Historical Provider, MD   metoprolol tartrate (LOPRESSOR) 50 MG tablet Take 50 mg by mouth 2 times daily   Yes Historical Provider, MD   predniSONE (DELTASONE) 5 MG tablet Take 1 tab daily 1/26/21  Yes Historical Provider, MD   acetaminophen (TYLENOL) 500 MG tablet Take 500 mg by mouth every 6 hours as needed   Yes Historical Provider, MD   folic acid (FOLVITE) 1 MG tablet Take 1 mg by mouth daily 1/26/21  Yes Historical Provider, MD   furosemide (LASIX) 40 MG tablet Take 80 mg by mouth 2 times daily   Yes Historical Provider, MD   ALPRAZolam (XANAX) 0.5 MG tablet Take 0.5 mg by mouth 2 times daily as needed. Yes Historical Provider, MD   Multiple Vitamins-Minerals (OCUVITE ADULT 50+ PO) Take by mouth   Yes Historical Provider, MD   allopurinol (ZYLOPRIM) 300 MG tablet Take 300 mg by mouth daily   Yes Historical Provider, MD   ARIPiprazole (ABILIFY) 5 MG tablet Take 5 mg by mouth daily   Yes Historical Provider, MD   lamoTRIgine (LAMICTAL) 25 MG CHEW chew tab Take 25 mg by mouth daily   Yes Historical Provider, MD   colchicine (COLCRYS) 0.6 MG tablet Take 0.6 mg by mouth daily   Yes Historical Provider, MD   isosorbide mononitrate (IMDUR) 30 MG extended release tablet Take 30 mg by mouth daily   Yes Historical Provider, MD   amitriptyline (ELAVIL) 50 MG tablet Take 50 mg by mouth nightly   Yes Historical Provider, MD   hydrOXYzine (ATARAX) 10 MG tablet Take 10 mg by mouth 3 times daily as needed for Itching   Yes Historical Provider, MD        Allergies:  No known allergies     Review of Systems:   Constitutional: there has been no unanticipated weight loss. There's been no change in energy level, sleep pattern, or activity level. Eyes: No visual changes or diplopia. No scleral icterus. ENT: No Headaches, hearing loss or vertigo. No mouth sores or sore throat.   Cardiovascular: Reviewed in HPI  Respiratory: No cough or wheezing, no sputum production. No hematemesis. Gastrointestinal: No abdominal pain, appetite loss, blood in stools. No change in bowel or bladder habits. Genitourinary: No dysuria, trouble voiding, or hematuria. Musculoskeletal:  No gait disturbance, weakness or joint complaints. Integumentary: No rash or pruritis. Neurological: No headache, diplopia, change in muscle strength, numbness or tingling. No change in gait, balance, coordination, mood, affect, memory, mentation, behavior. Psychiatric: No anxiety, no depression. Endocrine: No malaise, fatigue or temperature intolerance. No excessive thirst, fluid intake, or urination. No tremor. Hematologic/Lymphatic: No abnormal bruising or bleeding, blood clots or swollen lymph nodes. Allergic/Immunologic: No nasal congestion or hives. Physical Examination:    Vitals:    10/28/22 1438   BP: 124/72   Pulse: 94   SpO2: 98%          Wt Readings from Last 1 Encounters:   10/28/22 278 lb (126.1 kg)       Constitutional and General Appearance:NAD  Skin:good turgor,intact without lesions  HEENT: EOMI ,normal  Neck:no JVD     Respiratory:  Normal excursion and expansion without use of accessory muscles  Resp Auscultation: Normal breath sounds without dullness  Cardiovascular:  Soft murmur + , stable  Heart tones are crisp and normal  Cervical veins are not engorged  The carotid upstroke is normal in amplitude and contour without delay or bruit  Peripheral pulses are symmetrical and full  There is no clubbing, cyanosis of the extremities. BLE +1   Femoral Arteries: 2+ and equal  Pedal Pulses: 2+ and equal   Abdomen:  No masses or tenderness  Liver/Spleen: No Abnormalities Noted  Neurological/Psychiatric:  Alert and oriented in all spheres  Moves all extremities well +Cane for balance assist   Exhibits normal gait balance and coordination  No abnormalities of mood, affect, memory, mentation, or behavior are noted    Echo 9/2021  Summary  Technically difficult examination.   Normal left ventricle size and systolic function with an estimated ejection fraction of 60%. No regional wall motion abnormalities are  seen. There is concentric left ventricular hypertrophy. E/e\"= 13. Diastolic filling parameters suggests grade I diastolic dysfunction. Mild mitral regurgitation  The left atrium is mildly dilated. The aortic valve area is calculated at 1.44 cm2 with a maximum pressure gradient of 23 mmHg and a mean pressure gradient of 15  mmHg. This is c/w mild aortic stenosis. Mild aortic regurgitation. The right ventricle is mildly enlarged but appears normal in function. Moderate tricuspid regurgitation. PASP 39mmHg. The right atrium is dilated. IVC not well visualized. Echo 7/20/20   Overall left ventricular ejection fraction is estimated to be 55-60%. The left ventricular wall motion is normal.  The Aortic Valve is mildly calcified. There is mild concentric left ventricular hypertrophy. There is mild mitral regurgitation. Right ventricular systolic pressure is severely elevated at >55 mmHg. The mitral inflow pattern is consistent with Diastolic Dysfunction. Mild valvular aortic stenosis. Assessment/Plan:    1. Hypertension-   /72 (Site: Left Upper Arm, Position: Sitting, Cuff Size: Large Adult)   Pulse 94   Ht 5' 1\" (1.549 m)   Wt 278 lb (126.1 kg)   SpO2 98%   BMI 52.53 kg/m²      2. Hyperlipidemia   controlled  managed by PCP   8/27/21  TG 90 HDL 54 LDL 77   3. Elevated right ventricular systolic pressure  0/2652 echo results above   4. Aortic valve stenosis / MR  mild   rProgression but still moderate on todays echo   5. Abnormal stress test-  Lexiscan 9/9/21> Mild stress induced reversible ischemia of the inferior lateral wall. Wall motion abnormality of the apex, inferior wall and septum with abnormally low ejection fraction of 50%. Discussed results of stress testing- would consider angiogram high risk .  May be falsely abnormal so cannot justify risk of testing      Plan:  Cardiac test and lab results personally reviewed by me during this office visit and discussed. Discontinue Imdur   Nitro ordered as needed for CP or tightness- pt will call if this increases in frequency. Continue risk factor modifications. Call for any change in symptoms, call to report any changes in shortness of breath or development of chest pain with activity. Return for regular follow up in 6 months     I appreciate the opportunity of cooperating in the care of this individual.    Maryjo Mcmillan. Caleb Villavicencio M.D., [facility]    Patient's problem list, medications, allergies, past medical, surgical, social and family histories were reviewed and updated as appropriate. Scribe Attestation: This note was scribed in the presence of Dr. Caleb Villavicencio by Jn Shelley RN. The scribe's documentation has been prepared under my direction and personally reviewed by me in its entirety. I confirm that the note above accurately reflects all work, treatment, procedures, and medical decision making performed by me.

## 2022-10-28 ENCOUNTER — PROCEDURE VISIT (OUTPATIENT)
Dept: CARDIOLOGY CLINIC | Age: 77
End: 2022-10-28
Payer: MEDICARE

## 2022-10-28 ENCOUNTER — OFFICE VISIT (OUTPATIENT)
Dept: CARDIOLOGY CLINIC | Age: 77
End: 2022-10-28
Payer: MEDICARE

## 2022-10-28 VITALS
BODY MASS INDEX: 52.49 KG/M2 | WEIGHT: 278 LBS | OXYGEN SATURATION: 98 % | DIASTOLIC BLOOD PRESSURE: 72 MMHG | HEART RATE: 94 BPM | HEIGHT: 61 IN | SYSTOLIC BLOOD PRESSURE: 124 MMHG

## 2022-10-28 DIAGNOSIS — I10 HYPERTENSION, UNSPECIFIED TYPE: ICD-10-CM

## 2022-10-28 DIAGNOSIS — E78.5 HYPERLIPIDEMIA, UNSPECIFIED HYPERLIPIDEMIA TYPE: ICD-10-CM

## 2022-10-28 DIAGNOSIS — I51.7 VENTRICULAR HYPERTROPHY: ICD-10-CM

## 2022-10-28 DIAGNOSIS — I35.0 AORTIC VALVE STENOSIS, ETIOLOGY OF CARDIAC VALVE DISEASE UNSPECIFIED: ICD-10-CM

## 2022-10-28 DIAGNOSIS — I35.0 AORTIC VALVE STENOSIS, ETIOLOGY OF CARDIAC VALVE DISEASE UNSPECIFIED: Primary | ICD-10-CM

## 2022-10-28 LAB
LV EF: 65 %
LVEF MODALITY: NORMAL

## 2022-10-28 PROCEDURE — 3078F DIAST BP <80 MM HG: CPT | Performed by: INTERNAL MEDICINE

## 2022-10-28 PROCEDURE — 99214 OFFICE O/P EST MOD 30 MIN: CPT | Performed by: INTERNAL MEDICINE

## 2022-10-28 PROCEDURE — 93306 TTE W/DOPPLER COMPLETE: CPT | Performed by: INTERNAL MEDICINE

## 2022-10-28 PROCEDURE — 1123F ACP DISCUSS/DSCN MKR DOCD: CPT | Performed by: INTERNAL MEDICINE

## 2022-10-28 PROCEDURE — 3074F SYST BP LT 130 MM HG: CPT | Performed by: INTERNAL MEDICINE

## 2022-10-28 RX ORDER — NITROGLYCERIN 0.4 MG/1
0.4 TABLET SUBLINGUAL EVERY 5 MIN PRN
Qty: 25 TABLET | Refills: 3 | Status: SHIPPED | OUTPATIENT
Start: 2022-10-28

## 2022-10-28 NOTE — PATIENT INSTRUCTIONS
Follow up with Dr Estela Guevara in 6 months   Stop Imdur  Start using Nitro for Chest pain or tightness. Call Dr Estela Guevara if that symptom increases in frequency. Call for any questions or concerns.

## 2023-04-18 PROBLEM — R94.39 ABNORMAL CARDIOVASCULAR STRESS TEST: Status: ACTIVE | Noted: 2023-04-18

## 2023-04-18 NOTE — PROGRESS NOTES
Dysfunction. Mild valvular aortic stenosis. Assessment/Plan:    1. Hypertension-   /74 (Site: Left Lower Arm, Position: Sitting, Cuff Size: Medium Adult)   Pulse 74   Ht 5' 1\" (1.549 m)   Wt 261 lb 4.8 oz (118.5 kg)   SpO2 98%   BMI 49.37 kg/m²   Aldactone, HCTZ, Lopressor   2. Hyperlipidemia   controlled  managed by PCP   8/27/21  TG 90 HDL 54 LDL 77   3. Elevated right ventricular systolic pressure  48/9466 RVSP 40 mmHg   4. Aortic valve stenosis / MR  Echo 10/2022  mild MR  Mild to moderate AS  Echo in 6m   5. Abnormal stress test-  Lexiscan 9/9/21> Mild stress induced reversible ischemia of the inferior lateral wall. Wall motion abnormality of the apex, inferior wall and septum with abnormally low ejection fraction of 50%. Discussed results of stress testing- would consider angiogram high risk . May be falsely abnormal so cannot justify risk of testing      Plan:  Cardiac test and lab results personally reviewed by me during this office visit and discussed. No medication changes  Continue risk factor modifications. Call for any change in symptoms, call to report any changes in shortness of breath or development of chest pain with activity. Return for regular follow up in 6 months with an echo the same day    I appreciate the opportunity of cooperating in the care of this individual.    Gaston Harris M.D., [facility] [place]    Patient's problem list, medications, allergies, past medical, surgical, social and family histories were reviewed and updated as appropriate. Scribe's Attestation: This note was scribed in the presence of Dr. Livan Harris MD by Julaina Heck RN. The scribe's documentation has been prepared under my direction and personally reviewed by me in its entirety. I confirm that the note above accurately reflects all work, treatment, procedures, and medical decision making performed by me.

## 2023-04-20 ENCOUNTER — OFFICE VISIT (OUTPATIENT)
Dept: CARDIOLOGY CLINIC | Age: 78
End: 2023-04-20
Payer: MEDICARE

## 2023-04-20 VITALS
DIASTOLIC BLOOD PRESSURE: 74 MMHG | HEIGHT: 61 IN | WEIGHT: 261.3 LBS | BODY MASS INDEX: 49.33 KG/M2 | HEART RATE: 74 BPM | SYSTOLIC BLOOD PRESSURE: 128 MMHG | OXYGEN SATURATION: 98 %

## 2023-04-20 DIAGNOSIS — I35.0 AORTIC VALVE STENOSIS, ETIOLOGY OF CARDIAC VALVE DISEASE UNSPECIFIED: Primary | ICD-10-CM

## 2023-04-20 DIAGNOSIS — I34.0 NONRHEUMATIC MITRAL VALVE REGURGITATION: ICD-10-CM

## 2023-04-20 DIAGNOSIS — E78.5 HYPERLIPIDEMIA, UNSPECIFIED HYPERLIPIDEMIA TYPE: ICD-10-CM

## 2023-04-20 DIAGNOSIS — R94.39 ABNORMAL CARDIOVASCULAR STRESS TEST: ICD-10-CM

## 2023-04-20 DIAGNOSIS — I10 HYPERTENSION, UNSPECIFIED TYPE: ICD-10-CM

## 2023-04-20 PROCEDURE — 3074F SYST BP LT 130 MM HG: CPT | Performed by: INTERNAL MEDICINE

## 2023-04-20 PROCEDURE — 1123F ACP DISCUSS/DSCN MKR DOCD: CPT | Performed by: INTERNAL MEDICINE

## 2023-04-20 PROCEDURE — 99214 OFFICE O/P EST MOD 30 MIN: CPT | Performed by: INTERNAL MEDICINE

## 2023-04-20 PROCEDURE — 3078F DIAST BP <80 MM HG: CPT | Performed by: INTERNAL MEDICINE

## 2023-04-20 NOTE — PATIENT INSTRUCTIONS
No medication changes  Continue risk factor modifications. Call for any change in symptoms, call to report any changes in shortness of breath or development of chest pain with activity.   Return for regular follow up in 6 months with an echo the same day

## 2023-08-16 NOTE — TELEPHONE ENCOUNTER
Last labs 9/22/22, K+ 4.0. Should she get lab work before next [facility] 10/31/23?

## 2023-08-16 NOTE — TELEPHONE ENCOUNTER
Received refill request for Spironolactone from [address].     Last ov: 04/20/2023 LES    Last Refill: 06/13/2022 #45 w/ 3 refills    Next appointment: 10/31/2023 LES

## 2023-08-21 RX ORDER — SPIRONOLACTONE 25 MG/1
TABLET ORAL
Qty: 45 TABLET | Refills: 3 | Status: SHIPPED | OUTPATIENT
Start: 2023-08-21

## 2023-10-31 ENCOUNTER — OFFICE VISIT (OUTPATIENT)
Dept: CARDIOLOGY CLINIC | Age: 78
End: 2023-10-31
Payer: MEDICARE

## 2023-10-31 ENCOUNTER — PROCEDURE VISIT (OUTPATIENT)
Dept: CARDIOLOGY CLINIC | Age: 78
End: 2023-10-31

## 2023-10-31 VITALS
OXYGEN SATURATION: 98 % | WEIGHT: 245 LBS | HEIGHT: 61 IN | DIASTOLIC BLOOD PRESSURE: 72 MMHG | HEART RATE: 73 BPM | BODY MASS INDEX: 46.26 KG/M2 | SYSTOLIC BLOOD PRESSURE: 126 MMHG

## 2023-10-31 DIAGNOSIS — I34.0 NONRHEUMATIC MITRAL VALVE REGURGITATION: ICD-10-CM

## 2023-10-31 DIAGNOSIS — I35.0 AORTIC VALVE STENOSIS, ETIOLOGY OF CARDIAC VALVE DISEASE UNSPECIFIED: ICD-10-CM

## 2023-10-31 DIAGNOSIS — I10 HYPERTENSION, UNSPECIFIED TYPE: Primary | ICD-10-CM

## 2023-10-31 DIAGNOSIS — E78.5 HYPERLIPIDEMIA, UNSPECIFIED HYPERLIPIDEMIA TYPE: ICD-10-CM

## 2023-10-31 PROCEDURE — 3074F SYST BP LT 130 MM HG: CPT | Performed by: INTERNAL MEDICINE

## 2023-10-31 PROCEDURE — 3078F DIAST BP <80 MM HG: CPT | Performed by: INTERNAL MEDICINE

## 2023-10-31 PROCEDURE — 99214 OFFICE O/P EST MOD 30 MIN: CPT | Performed by: INTERNAL MEDICINE

## 2023-10-31 PROCEDURE — 1123F ACP DISCUSS/DSCN MKR DOCD: CPT | Performed by: INTERNAL MEDICINE

## 2023-10-31 RX ORDER — NITROGLYCERIN 0.4 MG/1
0.4 TABLET SUBLINGUAL EVERY 5 MIN PRN
Qty: 25 TABLET | Refills: 3 | Status: SHIPPED | OUTPATIENT
Start: 2023-10-31

## 2023-10-31 NOTE — PATIENT INSTRUCTIONS
No medication changes  Continue risk factor modifications. Call for any change in symptoms, call to report any changes in shortness of breath or development of chest pain with activity.     Follow up in 6 months

## 2024-04-10 NOTE — PROGRESS NOTES
mmHg. This is c/w mild-moderate aortic stenosis. Mild aortic regurgitation is present. Pressure half-time is calculated at 781 msec. Mild to moderate tricuspid regurgitation. RVSP 30mmHg. Trivial pulmonic regurgitation. IVC not well visualized.      Assessment/Plan:    1. Hypertension-   /70 (Site: Left Lower Arm, Position: Sitting, Cuff Size: Medium Adult)   Pulse 64   Ht 1.549 m (5' 1\")   Wt 116.8 kg (257 lb 8 oz)   SpO2 98%   BMI 48.65 kg/m²   Remain on Aldactone, Lopressor     2. Hyperlipidemia   controlled  managed by PCP   8/27/21  TG 90 HDL 54 LDL 77  1/25/24     HDL 58 LDL 81   Remain on Lipitor 10 mg     3. Elevated right ventricular systolic pressure  10/2022 RVSP 40 mmHg  10/31/23 RVSP 30 mmHg     4. Aortic valve stenosis / MR  Echo 10/2022  mild MR  Mild to moderate AS  Echo 10/31/23 > stable - unchanged from a year ago     5.      Abnormal stress test-  Lexiscan 9/9/21> Mild stress induced reversible ischemia of the inferior lateral wall. Wall motion abnormality of the apex, inferior wall and septum with abnormally low ejection fraction of 50%.  At prior OV, discussed results of stress testing- would consider angiogram high risk . May be falsely abnormal so cannot justify risk of testing      Plan:    Cardiac test and lab results personally reviewed by me during this office visit and discussed.     No medication changes  Continue risk factor modifications.   Call for any change in symptoms, call to report any changes in shortness of breath or development of chest pain with activity.    Follow up in 6 mos with echo        I appreciate the opportunity of cooperating in the care of this individual.    Tadeo Harrington M.D., [facility]    Patient's problem list, medications, allergies, past medical, surgical, social and family histories were reviewed and updated as appropriate.    Scribe's attestation: This note was scribed in the presence of Dr Harrington by Ashu Verdugo, OSMAR.    The

## 2024-04-30 ENCOUNTER — OFFICE VISIT (OUTPATIENT)
Dept: CARDIOLOGY CLINIC | Age: 79
End: 2024-04-30
Payer: MEDICARE

## 2024-04-30 VITALS
DIASTOLIC BLOOD PRESSURE: 70 MMHG | WEIGHT: 257.5 LBS | HEIGHT: 61 IN | OXYGEN SATURATION: 98 % | HEART RATE: 64 BPM | BODY MASS INDEX: 48.62 KG/M2 | SYSTOLIC BLOOD PRESSURE: 122 MMHG

## 2024-04-30 DIAGNOSIS — E78.5 HYPERLIPIDEMIA, UNSPECIFIED HYPERLIPIDEMIA TYPE: ICD-10-CM

## 2024-04-30 DIAGNOSIS — I10 HYPERTENSION, UNSPECIFIED TYPE: ICD-10-CM

## 2024-04-30 DIAGNOSIS — I35.0 AORTIC VALVE STENOSIS, ETIOLOGY OF CARDIAC VALVE DISEASE UNSPECIFIED: Primary | ICD-10-CM

## 2024-04-30 DIAGNOSIS — R94.39 ABNORMAL CARDIOVASCULAR STRESS TEST: ICD-10-CM

## 2024-04-30 DIAGNOSIS — I34.0 NONRHEUMATIC MITRAL VALVE REGURGITATION: ICD-10-CM

## 2024-04-30 PROCEDURE — 99214 OFFICE O/P EST MOD 30 MIN: CPT | Performed by: INTERNAL MEDICINE

## 2024-04-30 PROCEDURE — 3074F SYST BP LT 130 MM HG: CPT | Performed by: INTERNAL MEDICINE

## 2024-04-30 PROCEDURE — 3078F DIAST BP <80 MM HG: CPT | Performed by: INTERNAL MEDICINE

## 2024-04-30 PROCEDURE — 1123F ACP DISCUSS/DSCN MKR DOCD: CPT | Performed by: INTERNAL MEDICINE

## 2024-04-30 RX ORDER — BLOOD SUGAR DIAGNOSTIC
STRIP MISCELLANEOUS
COMMUNITY
Start: 2024-04-08

## 2024-04-30 RX ORDER — NYSTATIN 100000 U/G
CREAM TOPICAL
COMMUNITY
Start: 2024-02-13

## 2024-04-30 RX ORDER — MINERAL OIL/HYDROPHIL PETROLAT
OINTMENT (GRAM) TOPICAL PRN
COMMUNITY

## 2024-04-30 NOTE — PATIENT INSTRUCTIONS
No medication changes  Continue risk factor modifications.   Call for any change in symptoms, call to report any changes in shortness of breath or development of chest pain with activity.    Follow up in 6 mos with echo same day

## 2024-08-16 RX ORDER — SPIRONOLACTONE 25 MG/1
TABLET ORAL
Qty: 45 TABLET | Refills: 3 | Status: SHIPPED | OUTPATIENT
Start: 2024-08-16

## 2024-08-16 NOTE — TELEPHONE ENCOUNTER
Requested Prescriptions     Pending Prescriptions Disp Refills    spironolactone (ALDACTONE) 25 MG tablet [Pharmacy Med Name: SPIRONOLACTONE 25 MG TABLET] 45 tablet 3     Sig: TAKE 1/2 TABLET BY MOUTH DAILY      [id number]      Last OV:  4/30/2024 LES    Next OV: 11/1/2024 LES    Last Labs: BMP 03/27/2024    Last Filled: 08/1/2023 LES

## 2024-10-02 NOTE — PROGRESS NOTES
[facility]   Cardiac Follow Up     Referring Provider:  Haily Romero NP-C     Chief Complaint   Patient presents with    Hyperlipidemia    6 Month Follow-Up     W/ echo    Hypertension        History of Present Illness:  Destini Duarte is a 79 y.o. female with a history of hypertension and hyperlipidemia, right ventricular hypertrophy. She has chronic bilateral lower extremity edema. Has a hx of sleep apnea. Echo 10/2022 showed normal EF, mild MR, mild to moderate MR, and RVSP was 40 mm Hg.    She established care with Dr Rosales (nephrology) 4/2/24.    Today she is here for 6 mos follow up with echocardiogram.  She feels recovered from bronchitis. No complaints.  She is ambulating with a cane.      Past Medical History:   has a past medical history of Hyperlipidemia and Hypertension.    Surgical History:   has a past surgical history that includes joint replacement (Bilateral).     Social History:   reports that she quit smoking about 54 years ago. Her smoking use included cigarettes. She has never used smokeless tobacco. She reports that she does not drink alcohol and does not use drugs.     Family History:  family history is not on file.     Home Medications:  Prior to Admission medications    Medication Sig Start Date End Date Taking? Authorizing Provider   diphenoxylate-atropine (LOMOTIL) 2.5-0.025 MG per tablet Take 1 tablet by mouth 4 times daily as needed for Diarrhea. Max Daily Amount: 4 tablets   Yes Alis Leung MD   benzonatate (TESSALON) 100 MG capsule Take 1 capsule by mouth 3 times daily as needed for Cough   Yes Alis Leung MD   dapagliflozin (FARXIGA) 5 MG tablet Take 1 tablet by mouth every morning   Yes Alis Leung MD   folic acid (FOLVITE) 1 MG tablet Take 1 tablet by mouth daily   Yes Alis Leung MD   spironolactone (ALDACTONE) 25 MG tablet TAKE 1/2 TABLET BY MOUTH DAILY 8/16/24  Yes Tadeo Harrington MD   Cholecalciferol (VITAMIN D-3 PO)

## 2024-11-01 ENCOUNTER — OFFICE VISIT (OUTPATIENT)
Dept: CARDIOLOGY CLINIC | Age: 79
End: 2024-11-01

## 2024-11-01 VITALS
HEIGHT: 61 IN | OXYGEN SATURATION: 98 % | HEART RATE: 74 BPM | WEIGHT: 258 LBS | BODY MASS INDEX: 48.71 KG/M2 | SYSTOLIC BLOOD PRESSURE: 122 MMHG | DIASTOLIC BLOOD PRESSURE: 68 MMHG

## 2024-11-01 DIAGNOSIS — E78.5 HYPERLIPIDEMIA, UNSPECIFIED HYPERLIPIDEMIA TYPE: ICD-10-CM

## 2024-11-01 DIAGNOSIS — I10 HYPERTENSION, UNSPECIFIED TYPE: Primary | ICD-10-CM

## 2024-11-01 DIAGNOSIS — I34.0 NONRHEUMATIC MITRAL VALVE REGURGITATION: ICD-10-CM

## 2024-11-01 RX ORDER — DIPHENOXYLATE HYDROCHLORIDE AND ATROPINE SULFATE 2.5; .025 MG/1; MG/1
1 TABLET ORAL 4 TIMES DAILY PRN
COMMUNITY

## 2025-03-12 RX ORDER — NITROGLYCERIN 0.4 MG/1
TABLET SUBLINGUAL
Qty: 25 TABLET | Refills: 3 | OUTPATIENT
Start: 2025-03-12

## 2025-03-12 NOTE — TELEPHONE ENCOUNTER
nitroGLYCERIN (NITROSTAT) 0.4 MG SL tablet     The original prescription was discontinued on 4/2/2024 by Tabby De Dios MA for the following reason: LIST CLEANUP.

## 2025-04-02 RX ORDER — NITROGLYCERIN 0.4 MG/1
TABLET SUBLINGUAL
Qty: 25 TABLET | Refills: 3 | Status: SHIPPED | OUTPATIENT
Start: 2025-04-02

## 2025-07-24 ENCOUNTER — OFFICE VISIT (OUTPATIENT)
Dept: CARDIOLOGY CLINIC | Age: 80
End: 2025-07-24
Payer: MEDICARE

## 2025-07-24 VITALS
DIASTOLIC BLOOD PRESSURE: 68 MMHG | SYSTOLIC BLOOD PRESSURE: 124 MMHG | OXYGEN SATURATION: 95 % | HEIGHT: 61 IN | WEIGHT: 273.4 LBS | HEART RATE: 66 BPM | BODY MASS INDEX: 51.62 KG/M2

## 2025-07-24 DIAGNOSIS — M25.512 ACUTE PAIN OF LEFT SHOULDER: ICD-10-CM

## 2025-07-24 DIAGNOSIS — M79.602 LEFT ARM PAIN: Primary | ICD-10-CM

## 2025-07-24 PROCEDURE — 1123F ACP DISCUSS/DSCN MKR DOCD: CPT | Performed by: INTERNAL MEDICINE

## 2025-07-24 PROCEDURE — 1159F MED LIST DOCD IN RCRD: CPT | Performed by: INTERNAL MEDICINE

## 2025-07-24 PROCEDURE — 3078F DIAST BP <80 MM HG: CPT | Performed by: INTERNAL MEDICINE

## 2025-07-24 PROCEDURE — 3074F SYST BP LT 130 MM HG: CPT | Performed by: INTERNAL MEDICINE

## 2025-07-24 PROCEDURE — 99214 OFFICE O/P EST MOD 30 MIN: CPT | Performed by: INTERNAL MEDICINE

## 2025-07-24 RX ORDER — PREDNISONE 10 MG/1
TABLET ORAL
COMMUNITY
Start: 2025-07-22

## 2025-07-24 RX ORDER — ALPRAZOLAM 0.5 MG
TABLET ORAL
COMMUNITY
Start: 2025-07-08

## 2025-07-24 RX ORDER — ALBUTEROL SULFATE 0.63 MG/3ML
SOLUTION RESPIRATORY (INHALATION)
COMMUNITY
Start: 2025-04-15

## 2025-07-24 RX ORDER — ALBUTEROL SULFATE AND BUDESONIDE 90; 80 UG/1; UG/1
AEROSOL, METERED RESPIRATORY (INHALATION)
COMMUNITY
Start: 2025-03-10

## 2025-07-24 RX ORDER — GABAPENTIN 100 MG/1
100 CAPSULE ORAL 3 TIMES DAILY
COMMUNITY
Start: 2025-03-10

## 2025-07-24 RX ORDER — CEPHALEXIN 500 MG/1
CAPSULE ORAL
COMMUNITY
Start: 2025-07-08

## 2025-07-24 RX ORDER — IBUPROFEN 800 MG/1
TABLET, FILM COATED ORAL
COMMUNITY
Start: 2025-07-21

## 2025-07-24 RX ORDER — COLCHICINE 0.6 MG/1
0.6 TABLET ORAL 3 TIMES DAILY
COMMUNITY

## 2025-07-24 NOTE — PATIENT INSTRUCTIONS
No medication changes today     Recommend seeing an orthopedic doctor in regard to your shoulder with continued pain and trouble moving it.  Referral placed  [facility], [address], [address]  [place] [address]  Call: [phone]  Mon-Fri 5-9pm  Sat 9-12    Follow up with Dr. Harrington in 6 months

## 2025-08-12 RX ORDER — SPIRONOLACTONE 25 MG/1
12.5 TABLET ORAL DAILY
Qty: 45 TABLET | Refills: 3 | Status: SHIPPED | OUTPATIENT
Start: 2025-08-12

## 2025-08-29 ENCOUNTER — TELEPHONE (OUTPATIENT)
Dept: CARDIOLOGY CLINIC | Age: 80
End: 2025-08-29

## 2025-09-02 ENCOUNTER — TELEPHONE (OUTPATIENT)
Dept: CARDIOLOGY CLINIC | Age: 80
End: 2025-09-02